# Patient Record
(demographics unavailable — no encounter records)

---

## 2025-05-28 NOTE — PLAN
[FreeTextEntry1] : # hld: emphasized compliance of statin, continue lipitor 40 mg QD  # onnychomychosis: check LFT, consider terbinafine

## 2025-05-28 NOTE — PHYSICAL EXAM
[Normal] : normal rate, regular rhythm, normal S1 and S2 and no murmur heard [de-identified] :  +onnychomychosis b/l first foot digit

## 2025-05-28 NOTE — HISTORY OF PRESENT ILLNESS
[FreeTextEntry1] : follow up  [de-identified] : 55 yo hx hld here for follow up.  c/o b/l toe pain worse when walking for the past couple of weeks.  is not compliant with her statin, saw cardio has stress testing/tte